# Patient Record
Sex: FEMALE | Race: WHITE | NOT HISPANIC OR LATINO | Employment: UNEMPLOYED | URBAN - METROPOLITAN AREA
[De-identification: names, ages, dates, MRNs, and addresses within clinical notes are randomized per-mention and may not be internally consistent; named-entity substitution may affect disease eponyms.]

---

## 2023-06-21 ENCOUNTER — CLINICAL SUPPORT (OUTPATIENT)
Dept: PEDIATRICS | Facility: CLINIC | Age: 18
End: 2023-06-21
Payer: COMMERCIAL

## 2023-06-21 DIAGNOSIS — Z23 ENCOUNTER FOR IMMUNIZATION: ICD-10-CM

## 2023-06-21 PROCEDURE — 90651 9VHPV VACCINE 2/3 DOSE IM: CPT | Performed by: PEDIATRICS

## 2023-06-21 PROCEDURE — 90471 IMMUNIZATION ADMIN: CPT | Performed by: PEDIATRICS

## 2023-07-28 ENCOUNTER — TELEPHONE (OUTPATIENT)
Dept: PEDIATRICS | Facility: CLINIC | Age: 18
End: 2023-07-28
Payer: COMMERCIAL

## 2023-07-28 DIAGNOSIS — F41.9 ANXIETY: ICD-10-CM

## 2023-07-28 NOTE — TELEPHONE ENCOUNTER
Mom, Rose, [phone], called b/c Jennifer has been going through a lot of stress and anxiety so mom asking about getting the names of providers.  Mom said she did talk therapy - 6 sessions last summer and before she went to college and they told her the next time she may want to consider medication for anxiety.  Mom said it'll be time to go back to college the end of next month and she's starting to get anxious again.      Last [facility] 12/27/22 w/WILFREDO and Jennifer did sign a consent form allowing conversation with WILFREDO.  Discussed w/mom that I would get back to them with WILFREDO's recommendation.  Please advise.

## 2023-07-31 NOTE — TELEPHONE ENCOUNTER
Psychiatry referral placed.      Discussed w/mom and Jennifer that referral was placed to psychiatry but that it's a pediatric referral and not sure if they'll see her b/c she's older than 16.  Discussed that they may give them contact info for adult psychiatry.  They understand the plan and were given the number for  Peds psychiatry and have no other questions.

## 2024-01-31 ENCOUNTER — OFFICE VISIT (OUTPATIENT)
Dept: OTOLARYNGOLOGY | Facility: CLINIC | Age: 19
End: 2024-01-31
Payer: COMMERCIAL

## 2024-01-31 ENCOUNTER — CLINICAL SUPPORT (OUTPATIENT)
Dept: AUDIOLOGY | Facility: CLINIC | Age: 19
End: 2024-01-31
Payer: COMMERCIAL

## 2024-01-31 VITALS — WEIGHT: 110.3 LBS | BODY MASS INDEX: 20.82 KG/M2 | TEMPERATURE: 96.2 F | HEIGHT: 61 IN

## 2024-01-31 DIAGNOSIS — Z01.10 NORMAL HEARING EXAM: Primary | ICD-10-CM

## 2024-01-31 DIAGNOSIS — H91.93 HEARING DIFFICULTY OF BOTH EARS: Primary | ICD-10-CM

## 2024-01-31 PROCEDURE — 1036F TOBACCO NON-USER: CPT | Performed by: NURSE PRACTITIONER

## 2024-01-31 PROCEDURE — 99203 OFFICE O/P NEW LOW 30 MIN: CPT | Performed by: NURSE PRACTITIONER

## 2024-01-31 PROCEDURE — 92567 TYMPANOMETRY: CPT | Performed by: AUDIOLOGIST

## 2024-01-31 PROCEDURE — 92557 COMPREHENSIVE HEARING TEST: CPT | Performed by: AUDIOLOGIST

## 2024-01-31 RX ORDER — BISMUTH SUBSALICYLATE 262 MG
1 TABLET,CHEWABLE ORAL DAILY
COMMUNITY

## 2024-01-31 RX ORDER — MAGNESIUM 200 MG
TABLET ORAL
COMMUNITY

## 2024-01-31 RX ORDER — MEDROXYPROGESTERONE ACETATE 150 MG/ML
INJECTION, SUSPENSION INTRAMUSCULAR
COMMUNITY
Start: 2023-12-19

## 2024-01-31 ASSESSMENT — PATIENT HEALTH QUESTIONNAIRE - PHQ9
2. FEELING DOWN, DEPRESSED OR HOPELESS: NOT AT ALL
SUM OF ALL RESPONSES TO PHQ9 QUESTIONS 1 AND 2: 0
1. LITTLE INTEREST OR PLEASURE IN DOING THINGS: NOT AT ALL

## 2024-01-31 NOTE — LETTER
January 31, 2024     Patient: Jennifer Coleman   YOB: 2005   Date of Visit: 1/31/2024       To Whom It May Concern:    Jennifer Coleman was seen in my clinic on 1/31/2024 at 11:00 am. Please excuse Jennifer for her absence from school on this day to make the appointment.    If you have any questions or concerns, please don't hesitate to call.         Sincerely,         Mary Boateng, APRN-CNP        CC: No Recipients

## 2024-01-31 NOTE — PROGRESS NOTES
Subjective   Patient ID: Jennifer Coleman is a 18 y.o. female who presents for Hearing Problem.  HPI  This patient is referred for evaluation of possible hearing loss.  The patient is accompanied by  her mother.  When asked about ear pain, hearing loss, itching, discharge from ear, tinnitus, aural fullness or autophony, the patient admits to bilateral hearing difficulty.  She describes this as difficulty with word understanding.  This has been present as long as she can remember.  Otherwise, she has no otologic complaints.  She denies any dizziness.   The patient does not wear a hearing aid.  When asked about a significant past otological history including history of prior ear surgery, noise exposure, exposure to ototoxic drugs or agents, and/or family history of hearing loss, the patient admits to none.    Review of Systems  A comprehensive or 10 points review of the patient´s constitutional, neurological, HEENT, pulmonary, cardiovascular and genito-urinary systems showed only those mentioned in history of present illness.    Objective   Physical Exam  Constitutional: no fever, chills, weight loss or weight gain   General appearance: Appears well, well-nourished, well groomed. No acute distress.   Communication: Normal communication   Psychiatric: Oriented to person, place and time. Normal mood and affect.   Neurologic: Cranial nerves II-XII grossly intact and symmetric bilaterally.   Head and Face:   Head: Atraumatic with no masses, lesions or scarring.   Face: Normal symmetry, no paralysis, synkinesis or facial tic. No scars or deformities.   TMJ: Normal, no trismus.   Eyes: Conjunctiva not edematous or erythematous   Ears: External inspection of ears with no deformity, scars or masses. Bilateral EACs clear and bilateral TMs intact with no signs of effusions     Neck: Normal appearing, symmetric, trachea midline.   Cardiovascular: Examination of peripheral vascular system shows no clubbing or cyanosis.   Respiratory:  No respiratory distress increased work of breathing. Inspection of the chest with symmetric chest expansion and normal respiratory effort.   Skin: No rashes in the head or neck  My interpretation of the audiogram done today is normal hearing with excellent word recognition scores and normal tympanograms bilaterally.  Assessment/Plan        This patient presents for initial evaluation of chronic acquired bilateral hearing difficulty.    Reassurance given that otologic exam and audiogram today are both normal.  We discussed that she may have central auditory processing disorder.  Our audiology department offers testing, but there is no treatment for this.  She may be able to get some assistance through her college with this diagnosis, but I cannot say for certain.  Patient and mom would like to think about testing.  They will contact my office if they would like to schedule.  Otherwise, she may follow-up with me as needed.  All questions were answered to patient's satisfaction.    This note was created using speech recognition transcription software. Despite proofreading, several typographical errors might be present that might affect the meaning of the content. Please call with any questions.      LISA Maharaj 01/31/24 11:19 AM

## 2024-01-31 NOTE — PROGRESS NOTES
AUDIOLOGIC EVALUATION      Name:  Jennifer Coleman  :  2005  Age:  18 y.o.  Date of Evaluation:  2024    Time: 10:30-10:50    HISTORY  Jennifer Coleman, 18 y.o., was seen for an audiologic assessment. She reported long-standing difficulty understanding speech even with increased volume. She noted she often turns up the TV and puts her phone on speaker to understand better. She had her hearing testing in childhood which revealed normal hearing. She recently had testing done for evaluation of ADHD, and reported that results ruled out ADHD as a concern. She had done research into central auditory processing disorders and would like to be evaluated further. She also has bilateral intermittent tinnitus. She denies otalgia, otorrhea, dizziness, aural pressure/fullness, history of noise exposure, history of otologic surgeries, family history of hearing loss, and recent falls.     Changes in hearing:  Perceived difficulty understanding speech  Tinnitus: Yes , Intermittent, Bilateral  Dizziness:  No    Falls: No   Aural Fullness:   No   Otalgia:  No   Otorrhea:   No   Hx of Noise Exposure: No   Previous Otologic Surgeries: No   Other significant history:  Previous testing for ADHD      RESULTS:  Otoscopic Evaluation:  Right Ear: Unremarkable  Left Ear: Unremarkable    Immittance Measures:  Right Ear: Type A -- indicating normal volume, pressure, and static compliance  Left Ear: Type A -- indicating normal volume, pressure, and static compliance    Acoustic Reflexes:  Right Ear: Did not test  Left Ear: Did not test    Distortion Product Otoacoustic Emissions:   Right Ear: Could not test -- equipment error  Left Ear: Could not test -- equipment error    Pure Tone Audiometry:    Right Ear: Hearing within normal limits 250-8000 Hz  Left Ear: Hearing within normal limits 250-8000 Hz  Asymmetry: No    No previous testing to compare to today's results.    Reliability:   Good    Speech Audiometry:   Right: Speech Reception  Threshold (SRT) was obtained at 5 dBHL.   SRT/PTA in Good agreement with pure tone average.    Left: Speech Reception Threshold (SRT) was obtained at 15 dBHL.   SRT/PTA in Good agreement with pure tone average.    Word Recognition Scores (WRS):  Right Ear: excellent (100%) in quiet when words were presented at 45 dBHL.   Left Ear: excellent (100%) in quiet when words were presented at 45 dBHL.     Asymmetry: No      IMPRESSIONS:  Today's testing revealed hearing within normal limits and excellent WRS, bilaterally. The results were discussed with the patient. I noted that we do not test adults for central auditory processing at our office. Should she choose to pursue further evaluation for central auditory processing disorder, I noted that [facility] [place] [phone], has auditory processing evaluations available to adults.       RECOMMENDATIONS:  1.) Annual audiologic assessment or sooner if issues are noted  2.) Follow-up with referring physician as recommended  3.) Schedule auditory processing evaluation with [facility] [place] [phone] pending patient interest      Darlene Hathaway, CCC-A  Clinical Audiologist              KEY  SNHL Sensorineural Hearing Loss   CHL Conductive Hearing Loss   MHL Mixed Hearing Loss   SSNHL Sudden Sensorineural Hearing Loss   WNL Within Normal Limits   PTA Pure Tone Average   TM Tympanic Membraned   ECV Ear Canal Volume   SRT Speech Reception Threshold   WRS Word Recognition Score